# Patient Record
Sex: MALE | Race: WHITE | URBAN - METROPOLITAN AREA
[De-identification: names, ages, dates, MRNs, and addresses within clinical notes are randomized per-mention and may not be internally consistent; named-entity substitution may affect disease eponyms.]

---

## 2017-10-27 ENCOUNTER — THERAPY VISIT (OUTPATIENT)
Dept: PHYSICAL THERAPY | Facility: CLINIC | Age: 62
End: 2017-10-27
Payer: COMMERCIAL

## 2017-10-27 DIAGNOSIS — M77.11 LATERAL EPICONDYLITIS OF RIGHT ELBOW: Primary | ICD-10-CM

## 2017-10-27 PROCEDURE — 97110 THERAPEUTIC EXERCISES: CPT | Mod: GP | Performed by: PHYSICAL THERAPIST

## 2017-10-27 PROCEDURE — 97161 PT EVAL LOW COMPLEX 20 MIN: CPT | Mod: GP | Performed by: PHYSICAL THERAPIST

## 2017-10-27 NOTE — PROGRESS NOTES
Subjective:    Patient is a 62 year old male presenting with rehab left ankle/foot hpi.                                      Pertinent medical history includes:  Migraines.    Other surgeries include:  Orthopedic surgery (C5 C8 disc surgery).  Medication history: Gout, arthritis.      Primary job tasks include:  Prolonged standing.                                Objective:    System    Physical Exam    General     ROS    Assessment/Plan:

## 2017-10-27 NOTE — LETTER
[facility] [place] [facility]  [address]  [address]  [facility] [address]  [phone]    2017    Re: Dominic Baez   :   1955  MRN:  [id number]   REFERRING PHYSICIAN:   Kristofer Stanley    [facility] [facility]    Date of Initial Evaluation:  10-27-17  Visits:  Rxs Used: 1  Reason for Referral:  Lateral epicondylitis of right elbow    EVALUATION SUMMARY    Subjective:  Patient is a 62 year old male presenting with rehab right elbow hpi, a right elbow condition.  Condition occurred with:  Repetition/overuse.  Condition occurred: during recreation/sport (Tennis).  This is a chronic condition  Decades ago.  Patient reports pain:  Lateral and lateral epicondyle.  Pain is described as aching and is intermittent and reported as 1/10.  Associated symptoms:  Loss of motion/stiffness and loss of strength.   Symptoms are exacerbated by activity, lifting and carrying (tennis) and relieved by rest and bracing/immobilizing.  Since onset symptoms are unchanged.  Previous treatment includes physical therapy (cortisone injections, PRP injection 3 weeks ago).  General health as reported by patient is excellent.  Pertinent medical history includes:  Migraines.  Medical allergies: no.  Other surgeries include:  C5 C8 disc surgery.  Current medications:  Gout, arthritis.  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  Patient is working in normal job without restrictions.     Barriers include:  None as reported by the patient.  Red flags:  None as reported by the patient.              Objective:  Standing Alignment:    Shoulder/UE:  Normal  Flexibility/Screens:   Negative screens: Cervical or Shoulder       ROM:  AROM:  normal  PROM:  normal  Strength:    Flexion Elbow:  Left: 5/5 Pain:    Right: 5/5 Pain:  Extension Elbow: Left: 5/5 Pain:    Right: 5/5 Pain:  Flexion  Wrist:  Left: 5/5 Pain:    Right: 5/5  Pain:+  Extension Wrist: Left: 5/5 Pain:  Right:/5 Pain:+  Supination Elbow/Wrist: Left: 5/5 Pain:    Right: 5-/5  Pain:+  Pronation Elbow/Wrist: Left: 5/5 Pain:        Right: 5-/5  Pain:+      Re: Dominic Baez   :   1955    :  Dominance: Right   Left: WNL      Right: WNL - mild pain  Ligament Testing:not assessed  Special Testing:    Right wrist/elbow positive for the following special tests: Lateral EpicondylitisRight wrist/elbow negative for the following special tests: Medial Epicondylitis  Palpation:    Right wrist/elbow tenderness present at:Lateral Epicondyle and Wrist Extensors  General   ROS    Assessment/Plan:    Patient is a 62 year old male with right side elbow complaints.    Patient has the following significant findings with corresponding treatment plan.                Diagnosis 1:  Right Lateral Epicondylitis  Pain -  hot/cold therapy, US, electric stimulation, manual therapy, self management, education and home program  Decreased strength - therapeutic exercise and therapeutic activities  Inflammation - US, electric stimulation and ECSWT  Impaired muscle performance - neuro re-education  Decreased function - therapeutic activities    Therapy Evaluation Codes:   1) History comprised of:   Personal factors that impact the plan of care:      Time since onset of symptoms.    Comorbidity factors that impact the plan of care are:      Migraines/headaches.     Medications impacting care: Anti-inflammatory.  2) Examination of Body Systems comprised of:   Body structures and functions that impact the plan of care:      Elbow.   Activity limitations that impact the plan of care are:      Cooking, Driving, Grasping, Lifting, Sports and Working.  3) Clinical presentation characteristics are:   Stable/Uncomplicated.  4) Decision-Making    Low complexity using standardized patient assessment instrument and/or measureable assessment of functional  outcome.  Cumulative Therapy Evaluation is: Low complexity.    Previous and current functional limitations:  (See Goal Flow Sheet for this information)    Short term and Long term goals: (See Goal Flow Sheet for this information)     Communication ability:  Patient appears to be able to clearly communicate and understand verbal and written communication and follow directions correctly.  Treatment Explanation - The following has been discussed with the patient:   RX ordered/plan of care  Anticipated outcomes  Re: Dominic Baez   :   1955    Possible risks and side effects  This patient would benefit from PT intervention to resume normal activities.   Rehab potential is good.  Frequency:  1 X week, once daily  Duration:  for 4 weeks tapering to 2 x month for 2 months  Discharge Plan:  Achieve all LTG.  Independent in home treatment program.  Reach maximal therapeutic benefit.    Thank you for your referral.    INQUIRIES  Therapist: Maco Valiente, PT   [facility] [place]  [address] [place] [address]  [address]  [place] [address]  Phone: [phone]  Fax: [phone]

## 2017-10-27 NOTE — MR AVS SNAPSHOT
After Visit Summary   10/27/2017    Dominic Baez    MRN: [id number]           Patient Information     Date Of Birth          1955        Visit Information        Provider Department      10/27/2017 10:50 AM Maco Valiente, [facility] [place] [facility]        Today's Diagnoses     Lateral epicondylitis of right elbow    -  1       Follow-ups after your visit        Your next 10 appointments already scheduled     Nov 03, 2017 10:50 AM CDT   KADY Extremity with Maco Valiente PT   [facility] [place] [facility] ([facility] [place])    [address] [place] [address]  [address]  [place] [address]   [phone]            Nov 17, 2017 10:50 AM CST   KADY Extremity with Maco Valiente PT   [facility] [place] [facility] ([facility] [place])    [address] [place] [address]  [address]  [place] [address]   [phone]              Who to contact     If you have questions or need follow up information about today's clinic visit or your schedule please [facility] directly at [phone].  Normal or non-critical lab and imaging results will be communicated to you by ImpactMediahart, letter or phone within 4 business days after the clinic has received the results. If you do not hear from us within 7 days, please contact the clinic through ImpactMediahart or phone. If you have a critical or abnormal lab result, we will notify you by phone as soon as possible.  Submit refill requests through NearWoo or call your pharmacy and they will forward the refill request to us. Please allow 3 business days for your refill to be completed.          Additional Information About Your Visit        NearWoo Information     NearWoo lets you send messages to your doctor, view your test results, renew your prescriptions, schedule appointments and more. To sign up, go to  [email or web address] . Click on \"Log in\" on the left side of the screen, which will take you to the Welcome page. Then click on \"Sign up Now\" on the right side of the page.     You will be asked to enter the access code listed below, as well as some personal information. Please follow the directions to create your username and password.     Your access code is: [email or web address]  Expires: 2018 12:13 PM     Your access code will  in 90 days. If you need help or a new code, please call your [place] clinic or [phone].        Care EveryWhere ID     This is your Care EveryWhere ID. This could be used by other organizations to access your [place] medical records  [id number]         Blood Pressure from Last 3 Encounters:   No data found for BP    Weight from Last 3 Encounters:   No data found for Wt              We Performed the Following     HC PT EVAL, LOW COMPLEXITY     KADY INITIAL EVAL REPORT     THERAPEUTIC EXERCISES        Primary Care Provider    Physician No Ref-Primary       NO REF-PRIMARY PHYSICIAN        Equal Access to Services     [facility]: Hadii aad ku hadasho Soomaali, waaxda luqadaha, qaybta kaalmada adeegyajoanna, waxay braeden quesada . So [facility] [phone].    ATENCIÓN: Si habla español, tiene a can disposición servicios gratuitos de asistencia lingüística. Llame al [phone].    We comply with applicable federal civil rights laws and [place] laws. We do not discriminate on the basis of race, color, national origin, age, disability, sex, sexual orientation, or gender identity.            Thank you!     Thank you for choosing [facility]  for your care. Our goal is always to provide you with excellent care. Hearing back from our patients is one way we can continue to improve our services. Please take a few minutes to complete the written survey that you may receive in the mail after your visit with us. Thank " you!             Your Updated Medication List - Protect others around you: Learn how to safely use, store and throw away your medicines at www.disposemymeds.org.      Notice  As of 10/27/2017 12:13 PM    You have not been prescribed any medications.

## 2017-10-27 NOTE — PROGRESS NOTES
Subjective:    Patient is a 62 year old male presenting with rehab right elbow hpi.   Dominic Baez is a 62 year old male with a right elbow condition.  Condition occurred with:  Repetition/overuse.  Condition occurred: during recreation/sport (Tennis).  This is a chronic condition  Decades ago  .    Patient reports pain:  Lateral and lateral epicondyle.    Pain is described as aching and is intermittent and reported as 1/10.  Associated symptoms:  Loss of motion/stiffness and loss of strength.   Symptoms are exacerbated by activity, lifting and carrying (tennis) and relieved by rest and bracing/immobilizing.  Since onset symptoms are unchanged.    Previous treatment includes physical therapy (cortisone injections, PRP injection 3 weeks ago).    General health as reported by patient is excellent.  Pertinent medical history includes:  Migraines.  Medical allergies: no.  Other surgeries include:  No.  Current medications:  None as reported by the patient.  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      Barriers include:  None as reported by the patient.    Red flags:  None as reported by the patient.                        Objective:    Standing Alignment:      Shoulder/UE:  Normal                  Flexibility/Screens:   Negative screens: Cervical or Shoulder                                   ROM:  AROM:  normal                    PROM:  normal                        Strength:    Flexion Elbow:  Left: 5/5 Pain:    Right: 5/5 Pain:  Extension Elbow: Left: 5/5 Pain:    Right: 5/5 Pain:  Flexion Wrist:  Left: 5/5 Pain:    Right: 5/5  Pain:+  Extension Wrist: Left: 5/5 Pain:  Right:/5 Pain:+  Supination Elbow/Wrist: Left: 5/5 Pain:    Right: 5-/5  Pain:+  Pronation Elbow/Wrist: Left: 5/5 Pain:        Right: 5-/5  Pain:+    :  Dominance: Right   Left: WNL      Right: WNL - mild pain  Ligament Testing:not assessed        Special Testing:       Right wrist/elbow positive for the following special tests: Lateral EpicondylitisRight wrist/elbow negative for the following special tests: Medial Epicondylitis  Palpation:      Right wrist/elbow tenderness present at:Lateral Epicondyle and Wrist Extensors                                General     ROS    Assessment/Plan:      Patient is a 62 year old male with right side elbow complaints.    Patient has the following significant findings with corresponding treatment plan.                Diagnosis 1:  Right Lateral Epicondylitis  Pain -  hot/cold therapy, US, electric stimulation, manual therapy, self management, education and home program  Decreased strength - therapeutic exercise and therapeutic activities  Inflammation - US, electric stimulation and ECSWT  Impaired muscle performance - neuro re-education  Decreased function - therapeutic activities    Therapy Evaluation Codes:   1) History comprised of:   Personal factors that impact the plan of care:      Time since onset of symptoms.    Comorbidity factors that impact the plan of care are:      Migraines/headaches.     Medications impacting care: Anti-inflammatory.  2) Examination of Body Systems comprised of:   Body structures and functions that impact the plan of care:      Elbow.   Activity limitations that impact the plan of care are:      Cooking, Driving, Grasping, Lifting, Sports and Working.  3) Clinical presentation characteristics are:   Stable/Uncomplicated.  4) Decision-Making    Low complexity using standardized patient assessment instrument and/or measureable assessment of functional outcome.  Cumulative Therapy Evaluation is: Low complexity.    Previous and current functional limitations:  (See Goal Flow Sheet for this information)    Short term and Long term goals: (See Goal Flow Sheet for this information)     Communication ability:  Patient appears to be able to clearly communicate and understand verbal and written communication and  follow directions correctly.  Treatment Explanation - The following has been discussed with the patient:   RX ordered/plan of care  Anticipated outcomes  Possible risks and side effects  This patient would benefit from PT intervention to resume normal activities.   Rehab potential is good.    Frequency:  1 X week, once daily  Duration:  for 4 weeks tapering to 2 x month for 2 months  Discharge Plan:  Achieve all LTG.  Independent in home treatment program.  Reach maximal therapeutic benefit.    Please refer to the daily flowsheet for treatment today, total treatment time and time spent performing 1:1 timed codes.

## 2017-11-03 ENCOUNTER — THERAPY VISIT (OUTPATIENT)
Dept: PHYSICAL THERAPY | Facility: CLINIC | Age: 62
End: 2017-11-03
Payer: COMMERCIAL

## 2017-11-03 DIAGNOSIS — M77.11 LATERAL EPICONDYLITIS OF RIGHT ELBOW: ICD-10-CM

## 2017-11-03 PROCEDURE — 97140 MANUAL THERAPY 1/> REGIONS: CPT | Mod: GP | Performed by: PHYSICAL THERAPIST

## 2017-11-17 ENCOUNTER — THERAPY VISIT (OUTPATIENT)
Dept: PHYSICAL THERAPY | Facility: CLINIC | Age: 62
End: 2017-11-17
Payer: COMMERCIAL

## 2017-11-17 DIAGNOSIS — M77.11 LATERAL EPICONDYLITIS OF RIGHT ELBOW: ICD-10-CM

## 2017-11-17 PROCEDURE — 97140 MANUAL THERAPY 1/> REGIONS: CPT | Mod: GP | Performed by: PHYSICAL THERAPIST

## 2017-12-01 ENCOUNTER — THERAPY VISIT (OUTPATIENT)
Dept: PHYSICAL THERAPY | Facility: CLINIC | Age: 62
End: 2017-12-01
Payer: COMMERCIAL

## 2017-12-01 DIAGNOSIS — M77.11 LATERAL EPICONDYLITIS OF RIGHT ELBOW: ICD-10-CM

## 2017-12-01 PROCEDURE — 97140 MANUAL THERAPY 1/> REGIONS: CPT | Mod: GP | Performed by: PHYSICAL THERAPIST
